# Patient Record
Sex: FEMALE | Race: BLACK OR AFRICAN AMERICAN | NOT HISPANIC OR LATINO
[De-identification: names, ages, dates, MRNs, and addresses within clinical notes are randomized per-mention and may not be internally consistent; named-entity substitution may affect disease eponyms.]

---

## 2019-07-29 ENCOUNTER — APPOINTMENT (OUTPATIENT)
Dept: ORTHOPEDIC SURGERY | Facility: CLINIC | Age: 29
End: 2019-07-29
Payer: COMMERCIAL

## 2019-07-29 VITALS
HEIGHT: 66 IN | DIASTOLIC BLOOD PRESSURE: 77 MMHG | SYSTOLIC BLOOD PRESSURE: 107 MMHG | BODY MASS INDEX: 34.07 KG/M2 | WEIGHT: 212 LBS | HEART RATE: 83 BPM

## 2019-07-29 DIAGNOSIS — M79.671 PAIN IN RIGHT FOOT: ICD-10-CM

## 2019-07-29 DIAGNOSIS — Z78.9 OTHER SPECIFIED HEALTH STATUS: ICD-10-CM

## 2019-07-29 DIAGNOSIS — Z86.39 PERSONAL HISTORY OF OTHER ENDOCRINE, NUTRITIONAL AND METABOLIC DISEASE: ICD-10-CM

## 2019-07-29 DIAGNOSIS — S93.601A UNSPECIFIED SPRAIN OF RIGHT FOOT, INITIAL ENCOUNTER: ICD-10-CM

## 2019-07-29 PROBLEM — Z00.00 ENCOUNTER FOR PREVENTIVE HEALTH EXAMINATION: Status: ACTIVE | Noted: 2019-07-29

## 2019-07-29 PROCEDURE — 99204 OFFICE O/P NEW MOD 45 MIN: CPT

## 2019-07-29 PROCEDURE — 73630 X-RAY EXAM OF FOOT: CPT | Mod: RT

## 2019-07-29 RX ORDER — ESTRADIOL 0.1 MG/D
0.1 PATCH, EXTENDED RELEASE TRANSDERMAL
Qty: 24 | Refills: 0 | Status: ACTIVE | COMMUNITY
Start: 2019-01-18

## 2019-07-29 RX ORDER — MEDROXYPROGESTERONE ACETATE 10 MG/1
10 TABLET ORAL
Qty: 30 | Refills: 0 | Status: ACTIVE | COMMUNITY
Start: 2019-01-18

## 2019-07-29 RX ORDER — ASCORBIC ACID 125 MG
TABLET,CHEWABLE ORAL
Refills: 0 | Status: ACTIVE | COMMUNITY

## 2019-07-29 NOTE — HISTORY OF PRESENT ILLNESS
[Sneakers] : stephane [FreeTextEntry1] : Pt presents for initial visit with pain in her right foot, no known injury. 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

## 2019-07-29 NOTE — DISCUSSION/SUMMARY
[de-identified] : The patient was advised of her findings. She was explained she is likely experiencing symptoms of overuse and time on her feet while at work as well as in the gym treadmill.\par \par The patient will modify her activity level accordingly consider OTC NSAIDs and possible physical therapy.\par \par Reevaluation p.r.n.

## 2019-07-29 NOTE — PHYSICAL EXAM
[de-identified] : Examination of the patient's right foot discloses mild tenderness to palpation to the dorsum and medial plantar arch. No acute deformities are present. Passive active range of motion nontender. [de-identified] : X-rays of the right foot in AP lateral and oblique projections did not reveal any acute osseous changes.

## 2021-03-09 ENCOUNTER — APPOINTMENT (OUTPATIENT)
Dept: HEMATOLOGY ONCOLOGY | Facility: CLINIC | Age: 31
End: 2021-03-09
Payer: COMMERCIAL

## 2021-03-09 VITALS
TEMPERATURE: 97.7 F | WEIGHT: 226 LBS | OXYGEN SATURATION: 98 % | HEIGHT: 66 IN | DIASTOLIC BLOOD PRESSURE: 87 MMHG | SYSTOLIC BLOOD PRESSURE: 121 MMHG | HEART RATE: 108 BPM | BODY MASS INDEX: 36.32 KG/M2

## 2021-03-09 DIAGNOSIS — R00.2 PALPITATIONS: ICD-10-CM

## 2021-03-09 DIAGNOSIS — D64.9 ANEMIA, UNSPECIFIED: ICD-10-CM

## 2021-03-09 DIAGNOSIS — Z83.49 FAMILY HISTORY OF OTHER ENDOCRINE, NUTRITIONAL AND METABOLIC DISEASES: ICD-10-CM

## 2021-03-09 PROCEDURE — 99072 ADDL SUPL MATRL&STAF TM PHE: CPT

## 2021-03-09 PROCEDURE — 99202 OFFICE O/P NEW SF 15 MIN: CPT

## 2021-03-09 RX ORDER — CHROMIUM 200 MCG
TABLET ORAL
Refills: 0 | Status: ACTIVE | COMMUNITY

## 2021-03-09 RX ORDER — GLUC/MSM/COLGN2/HYAL/ANTIARTH3 375-375-20
TABLET ORAL
Refills: 0 | Status: ACTIVE | COMMUNITY

## 2021-04-09 ENCOUNTER — RESULT REVIEW (OUTPATIENT)
Age: 31
End: 2021-04-09

## 2021-07-13 NOTE — HISTORY OF PRESENT ILLNESS
[de-identified] : 30 years old female, found to have minimal anemia hemoglobin of 11.5 with the norm for her lab being 11.7 gr/dl labs per deciliter... He has been complaining of palpitation mostly around her menses and wanted to know if those are related to her anemia... Patient is seeing an [facility] cardiologist Dr. Amelia Mata... Is having a Holter done

## 2021-07-13 NOTE — ASSESSMENT
[FreeTextEntry1] : Discussed with patient the fact that she does have iron deficiency but minimal anemia which cannot explain the palpitation... Patient states she cannot work because of her symptoms...\par \par She is to continue work-up with cardiology and rheumatology.

## 2021-12-14 ENCOUNTER — FORM ENCOUNTER (OUTPATIENT)
Age: 31
End: 2021-12-14

## 2021-12-15 ENCOUNTER — TRANSCRIPTION ENCOUNTER (OUTPATIENT)
Age: 31
End: 2021-12-15